# Patient Record
Sex: FEMALE
[De-identification: names, ages, dates, MRNs, and addresses within clinical notes are randomized per-mention and may not be internally consistent; named-entity substitution may affect disease eponyms.]

---

## 2021-06-25 PROBLEM — Z00.00 ENCOUNTER FOR PREVENTIVE HEALTH EXAMINATION: Status: ACTIVE | Noted: 2021-06-25

## 2021-07-26 ENCOUNTER — APPOINTMENT (OUTPATIENT)
Dept: NEUROSURGERY | Facility: CLINIC | Age: 26
End: 2021-07-26

## 2021-08-05 ENCOUNTER — APPOINTMENT (OUTPATIENT)
Dept: NEUROSURGERY | Facility: CLINIC | Age: 26
End: 2021-08-05

## 2021-08-09 NOTE — HISTORY OF PRESENT ILLNESS
[FreeTextEntry1] : IIH [de-identified] : Pulsatile tinnitus since April 2021, LEFT >> RIGHT \par Then developed new headaches, worse at night\par Had visual changes but not blurry or double vision\par Then had a lumbar puncture with OP: 28 in the emergency room\par Post-lumbar puncture she felt  well but then developed spinal leak\par Saw ophthalmology after the spinal tap and there was no papilledema\par \par Medications\par Topiramate: vomiting, nausea, insomnia\par Diamox: cannot tolerate > 500 mg due to headaches, has tingling in hand feet, overall "very uncomfortable"\par \par Lost 50 lbs in the last 6 months\par \par Currently:\par Suffers from pulsatile tinnitus and positional headaches\par Cannot sleep without 4-5 pillows\par \par PMH\par Chololithiasis (had gall bladder surgery 11/2020)\par \par Has been on OCP for 5 years until recently